# Patient Record
Sex: FEMALE | Race: WHITE | URBAN - METROPOLITAN AREA
[De-identification: names, ages, dates, MRNs, and addresses within clinical notes are randomized per-mention and may not be internally consistent; named-entity substitution may affect disease eponyms.]

---

## 2021-07-21 ENCOUNTER — HOSPITAL ENCOUNTER (OUTPATIENT)
Age: 26
Setting detail: OBSERVATION
Discharge: HOME | End: 2021-07-22
Attending: OBSTETRICS & GYNECOLOGY | Admitting: OBSTETRICS & GYNECOLOGY
Payer: MEDICAID

## 2021-07-21 PROBLEM — N39.0 UTI (URINARY TRACT INFECTION): Status: ACTIVE | Noted: 2021-07-21

## 2021-07-21 PROBLEM — R10.9 ABDOMINAL PAIN: Status: ACTIVE | Noted: 2021-07-21

## 2021-07-21 LAB
BACTERIA: ABNORMAL /HPF
BASOPHILS ABSOLUTE: 0.05 E9/L (ref 0–0.2)
BASOPHILS RELATIVE PERCENT: 0.4 % (ref 0–2)
BILIRUBIN URINE: NEGATIVE
BLOOD, URINE: NEGATIVE
CLARITY: ABNORMAL
COLOR: YELLOW
EOSINOPHILS ABSOLUTE: 0.17 E9/L (ref 0.05–0.5)
EOSINOPHILS RELATIVE PERCENT: 1.5 % (ref 0–6)
EPITHELIAL CELLS, UA: ABNORMAL /HPF
GLUCOSE URINE: NEGATIVE MG/DL
HCT VFR BLD CALC: 30.7 % (ref 34–48)
HEMOGLOBIN: 10.9 G/DL (ref 11.5–15.5)
IMMATURE GRANULOCYTES #: 0.08 E9/L
IMMATURE GRANULOCYTES %: 0.7 % (ref 0–5)
KETONES, URINE: NEGATIVE MG/DL
LEUKOCYTE ESTERASE, URINE: ABNORMAL
LYMPHOCYTES ABSOLUTE: 2.23 E9/L (ref 1.5–4)
LYMPHOCYTES RELATIVE PERCENT: 19.4 % (ref 20–42)
MCH RBC QN AUTO: 30.8 PG (ref 26–35)
MCHC RBC AUTO-ENTMCNC: 35.5 % (ref 32–34.5)
MCV RBC AUTO: 86.7 FL (ref 80–99.9)
MONOCYTES ABSOLUTE: 0.71 E9/L (ref 0.1–0.95)
MONOCYTES RELATIVE PERCENT: 6.2 % (ref 2–12)
NEUTROPHILS ABSOLUTE: 8.27 E9/L (ref 1.8–7.3)
NEUTROPHILS RELATIVE PERCENT: 71.8 % (ref 43–80)
NITRITE, URINE: NEGATIVE
PDW BLD-RTO: 13.3 FL (ref 11.5–15)
PH UA: 7 (ref 5–9)
PLATELET # BLD: 219 E9/L (ref 130–450)
PMV BLD AUTO: 10.1 FL (ref 7–12)
PROTEIN UA: NEGATIVE MG/DL
RBC # BLD: 3.54 E12/L (ref 3.5–5.5)
RBC UA: ABNORMAL /HPF (ref 0–2)
SPECIFIC GRAVITY UA: 1.01 (ref 1–1.03)
UROBILINOGEN, URINE: 2 E.U./DL
WBC # BLD: 11.5 E9/L (ref 4.5–11.5)
WBC UA: ABNORMAL /HPF (ref 0–5)

## 2021-07-21 PROCEDURE — 36415 COLL VENOUS BLD VENIPUNCTURE: CPT

## 2021-07-21 PROCEDURE — 81001 URINALYSIS AUTO W/SCOPE: CPT

## 2021-07-21 PROCEDURE — 2580000003 HC RX 258: Performed by: OBSTETRICS & GYNECOLOGY

## 2021-07-21 PROCEDURE — 85025 COMPLETE CBC W/AUTO DIFF WBC: CPT

## 2021-07-21 RX ORDER — SODIUM CHLORIDE, SODIUM LACTATE, POTASSIUM CHLORIDE, CALCIUM CHLORIDE 600; 310; 30; 20 MG/100ML; MG/100ML; MG/100ML; MG/100ML
INJECTION, SOLUTION INTRAVENOUS CONTINUOUS
Status: DISCONTINUED | OUTPATIENT
Start: 2021-07-21 | End: 2021-07-22 | Stop reason: HOSPADM

## 2021-07-21 RX ORDER — ONDANSETRON 2 MG/ML
4 INJECTION INTRAMUSCULAR; INTRAVENOUS EVERY 6 HOURS PRN
Status: DISCONTINUED | OUTPATIENT
Start: 2021-07-21 | End: 2021-07-22 | Stop reason: HOSPADM

## 2021-07-21 RX ORDER — SODIUM CHLORIDE, SODIUM LACTATE, POTASSIUM CHLORIDE, AND CALCIUM CHLORIDE .6; .31; .03; .02 G/100ML; G/100ML; G/100ML; G/100ML
500 INJECTION, SOLUTION INTRAVENOUS ONCE
Status: COMPLETED | OUTPATIENT
Start: 2021-07-21 | End: 2021-07-21

## 2021-07-21 RX ADMIN — SODIUM CHLORIDE, POTASSIUM CHLORIDE, SODIUM LACTATE AND CALCIUM CHLORIDE 500 ML: 600; 310; 30; 20 INJECTION, SOLUTION INTRAVENOUS at 22:15

## 2021-07-22 VITALS
SYSTOLIC BLOOD PRESSURE: 103 MMHG | DIASTOLIC BLOOD PRESSURE: 60 MMHG | HEART RATE: 81 BPM | TEMPERATURE: 98 F | RESPIRATION RATE: 18 BRPM

## 2021-07-22 PROBLEM — O23.42 UTI IN PREGNANCY, SECOND TRIMESTER: Status: ACTIVE | Noted: 2021-07-21

## 2021-07-22 PROCEDURE — 99212 OFFICE O/P EST SF 10 MIN: CPT

## 2021-07-22 PROCEDURE — 96365 THER/PROPH/DIAG IV INF INIT: CPT

## 2021-07-22 PROCEDURE — 96374 THER/PROPH/DIAG INJ IV PUSH: CPT

## 2021-07-22 PROCEDURE — 6370000000 HC RX 637 (ALT 250 FOR IP): Performed by: OBSTETRICS & GYNECOLOGY

## 2021-07-22 PROCEDURE — 2580000003 HC RX 258: Performed by: OBSTETRICS & GYNECOLOGY

## 2021-07-22 PROCEDURE — G0378 HOSPITAL OBSERVATION PER HR: HCPCS

## 2021-07-22 PROCEDURE — 96360 HYDRATION IV INFUSION INIT: CPT

## 2021-07-22 PROCEDURE — 96376 TX/PRO/DX INJ SAME DRUG ADON: CPT

## 2021-07-22 PROCEDURE — 96361 HYDRATE IV INFUSION ADD-ON: CPT

## 2021-07-22 PROCEDURE — 6360000002 HC RX W HCPCS: Performed by: OBSTETRICS & GYNECOLOGY

## 2021-07-22 RX ORDER — CALCIUM CARBONATE 200(500)MG
500 TABLET,CHEWABLE ORAL ONCE
Status: COMPLETED | OUTPATIENT
Start: 2021-07-22 | End: 2021-07-22

## 2021-07-22 RX ORDER — NITROFURANTOIN 25; 75 MG/1; MG/1
100 CAPSULE ORAL EVERY 12 HOURS SCHEDULED
Qty: 14 CAPSULE | Refills: 0 | Status: SHIPPED | OUTPATIENT
Start: 2021-07-22 | End: 2021-07-29

## 2021-07-22 RX ORDER — NITROFURANTOIN 25; 75 MG/1; MG/1
100 CAPSULE ORAL EVERY 12 HOURS SCHEDULED
Status: DISCONTINUED | OUTPATIENT
Start: 2021-07-22 | End: 2021-07-22

## 2021-07-22 RX ORDER — NITROFURANTOIN 25; 75 MG/1; MG/1
100 CAPSULE ORAL EVERY 12 HOURS SCHEDULED
Status: DISCONTINUED | OUTPATIENT
Start: 2021-07-22 | End: 2021-07-22 | Stop reason: HOSPADM

## 2021-07-22 RX ADMIN — NITROFURANTOIN (MONOHYDRATE/MACROCRYSTALS) 100 MG: 75; 25 CAPSULE ORAL at 19:14

## 2021-07-22 RX ADMIN — SODIUM CHLORIDE, POTASSIUM CHLORIDE, SODIUM LACTATE AND CALCIUM CHLORIDE 125 ML/HR: 600; 310; 30; 20 INJECTION, SOLUTION INTRAVENOUS at 11:04

## 2021-07-22 RX ADMIN — SODIUM CHLORIDE 1000 MG: 9 INJECTION INTRAMUSCULAR; INTRAVENOUS; SUBCUTANEOUS at 11:58

## 2021-07-22 RX ADMIN — SODIUM CHLORIDE 1000 MG: 9 INJECTION INTRAMUSCULAR; INTRAVENOUS; SUBCUTANEOUS at 00:04

## 2021-07-22 RX ADMIN — CALCIUM CARBONATE (ANTACID) CHEW TAB 500 MG 500 MG: 500 CHEW TAB at 03:02

## 2021-07-22 NOTE — PROGRESS NOTES
Assumed care of patient, pt states she is feeling ok this morning, denies feelings cramping. Abdomen soft on palpation. Maternal perception of fetal movement present.  Fetal heart rate 152 via doppler

## 2021-07-22 NOTE — PROGRESS NOTES
Dr. Jaquez Dear in room to see patient. Plan of care discussed to receive second dose of iv antibiotic at noon. Pt verbalizes understanding and is in agreement with plan.  Pt given menu to order breakfast. Medical records from [place] to be obtained for this pregnancy

## 2021-07-22 NOTE — PROGRESS NOTES
Patient presents to unit with co cramping, lower back and abdomen. Perceives fetal movement, no bleeding or leaking fluid.

## 2021-07-22 NOTE — PROGRESS NOTES
Assumed care of patient at this time. Patient states having less discomfort than when on admission. Plan of care discussed, patient verbalizes understanding. Doppler tones done, 157. Patient states adequate fetal movement perceived.  IV fluids running per order

## 2021-07-22 NOTE — H&P
Subjective:      Sandra Noguera is an 31 y.o. female at 24 and 1/7 weeks gestation presenting with   Chief Complaint   Patient presents with    Abdominal Cramping   Patient was transferred from [facility] emergency room with no prenatal care in [place]. Patient was living in [place] and just moved back to this area. Denies bleeding and fluid leak. .She is also complaining of contractions every a few minutes lasting few seconds. Other associated symptoms include dysuria. Fetal Movement: normal.  History reviewed. No pertinent past medical history. Review of Systems  Pertinent items are noted in HPI. Objective:      /60   Pulse 81   Temp 98 °F (36.7 °C)   Resp 18   Blood pressure 103/60, pulse 81, temperature 98 °F (36.7 °C), resp. rate 18. General:   alert, appears stated age and cooperative   Cervix:   Deferred. FHT:   140 BPM     Lab Review    CBC:   Lab Results   Component Value Date    WBC 11.5 07/21/2021    RBC 3.54 07/21/2021    HGB 10.9 07/21/2021    HCT 30.7 07/21/2021    MCV 86.7 07/21/2021    MCH 30.8 07/21/2021    MCHC 35.5 07/21/2021    RDW 13.3 07/21/2021     07/21/2021    MPV 10.1 07/21/2021     CMP:  No results found for: NA, K, CL, CO2, BUN, CREATININE, GFRAA, AGRATIO, LABGLOM, GLUCOSE, PROT, LABALBU, CALCIUM, BILITOT, ALKPHOS, AST, ALT  U/A:    Lab Results   Component Value Date    COLORU Yellow 07/21/2021    PHUR 7.0 07/21/2021    WBCUA 10-20 07/21/2021    RBCUA NONE 07/21/2021    BACTERIA MODERATE 07/21/2021    CLARITYU SL CLOUDY 07/21/2021    SPECGRAV 1.015 07/21/2021    LEUKOCYTESUR SMALL 07/21/2021    UROBILINOGEN 2.0 07/21/2021    BILIRUBINUR Negative 07/21/2021    BLOODU Negative 07/21/2021    GLUCOSEU Negative 07/21/2021          Assessment:     21 weeks and 1 day pregnancy  UTI    Plan:      Monitored for contractions - findings: no contractions noted and normal fetal heart tones. Orders: IV Rocephin was given x2 doses.   Patient improved significantly and currently has no complaints. Will discharge patient home on Macrobid p.o  Follow-up in the office in approximately 1 to 2 weeks  .      Domingo Neri MD,MD,7/22/2021 6:41 PM

## 2021-08-10 PROBLEM — Z34.90 PRENATAL CARE, ANTEPARTUM: Status: ACTIVE | Noted: 2021-08-10

## 2021-08-10 PROBLEM — O09.30 INSUFFICIENT PRENATAL CARE, UNSPECIFIED TRIMESTER: Status: ACTIVE | Noted: 2021-08-10

## 2021-08-10 PROBLEM — O09.292: Status: ACTIVE | Noted: 2021-08-10

## 2021-08-10 PROBLEM — Z72.89: Status: ACTIVE | Noted: 2021-08-10

## 2021-08-30 PROBLEM — O09.899 RUBELLA NON-IMMUNE STATUS, ANTEPARTUM: Status: ACTIVE | Noted: 2021-08-30

## 2021-08-30 PROBLEM — Z28.39 RUBELLA NON-IMMUNE STATUS, ANTEPARTUM: Status: ACTIVE | Noted: 2021-08-30

## 2021-11-18 PROBLEM — Z34.90 PRENATAL CARE, ANTEPARTUM: Chronic | Status: ACTIVE | Noted: 2021-08-10

## 2021-11-19 PROBLEM — R10.9 ABDOMINAL PAIN: Status: RESOLVED | Noted: 2021-07-21 | Resolved: 2021-11-19
